# Patient Record
Sex: MALE | Race: WHITE | NOT HISPANIC OR LATINO
[De-identification: names, ages, dates, MRNs, and addresses within clinical notes are randomized per-mention and may not be internally consistent; named-entity substitution may affect disease eponyms.]

---

## 2021-08-25 PROBLEM — Z00.00 ENCOUNTER FOR PREVENTIVE HEALTH EXAMINATION: Status: ACTIVE | Noted: 2021-08-25

## 2021-08-26 ENCOUNTER — TRANSCRIPTION ENCOUNTER (OUTPATIENT)
Age: 36
End: 2021-08-26

## 2021-08-26 ENCOUNTER — NON-APPOINTMENT (OUTPATIENT)
Age: 36
End: 2021-08-26

## 2021-08-26 ENCOUNTER — APPOINTMENT (OUTPATIENT)
Dept: UROLOGY | Facility: CLINIC | Age: 36
End: 2021-08-26
Payer: COMMERCIAL

## 2021-08-26 VITALS
TEMPERATURE: 97.9 F | HEART RATE: 72 BPM | SYSTOLIC BLOOD PRESSURE: 120 MMHG | BODY MASS INDEX: 23.6 KG/M2 | OXYGEN SATURATION: 97 % | HEIGHT: 78 IN | DIASTOLIC BLOOD PRESSURE: 71 MMHG | WEIGHT: 204 LBS

## 2021-08-26 DIAGNOSIS — I86.1 SCROTAL VARICES: ICD-10-CM

## 2021-08-26 LAB
BILIRUB UR QL STRIP: NORMAL
CLARITY UR: CLEAR
COLLECTION METHOD: NORMAL
GLUCOSE UR-MCNC: NORMAL
HCG UR QL: 0.2 EU/DL
HGB UR QL STRIP.AUTO: NORMAL
KETONES UR-MCNC: NORMAL
LEUKOCYTE ESTERASE UR QL STRIP: NORMAL
NITRITE UR QL STRIP: NORMAL
PH UR STRIP: 5.5
PROT UR STRIP-MCNC: NORMAL
SP GR UR STRIP: 1.02

## 2021-08-26 PROCEDURE — 99204 OFFICE O/P NEW MOD 45 MIN: CPT

## 2021-08-26 NOTE — PHYSICAL EXAM
[General Appearance - Well Developed] : well developed [General Appearance - Well Nourished] : well nourished [Normal Appearance] : normal appearance [Well Groomed] : well groomed [General Appearance - In No Acute Distress] : no acute distress [Edema] : no peripheral edema [Abdomen Soft] : soft [] : no hepato-splenomegaly [Abdomen Tenderness] : non-tender [Abdomen Hernia] : no hernia was discovered [Costovertebral Angle Tenderness] : no ~M costovertebral angle tenderness [Urethral Meatus] : meatus normal [Penis Abnormality] : normal circumcised penis [Testes Tenderness] : no tenderness of the testes [Epididymis] : the epididymides were normal [Testes Mass (___cm)] : there were no testicular masses [FreeTextEntry1] : Left testis 2.5 x 5 cm moderate varicocele, empties supine. Right testis 3 x 5 cm, no varicocele noted. [Normal Station and Gait] : the gait and station were normal for the patient's age [Skin Turgor] : supple [No Focal Deficits] : no focal deficits [Oriented To Time, Place, And Person] : oriented to person, place, and time [Affect] : the affect was normal [Mood] : the mood was normal [Not Anxious] : not anxious

## 2021-08-26 NOTE — ASSESSMENT
[FreeTextEntry1] : We discussed findings on physical examination of the left varicocele and the possible correlation between the varicocele and male infertility and also possibly testicular pain.  I recommended evaluation including morning hormone panel and this was drawn today.  I also recommended a semen analysis and scrotal ultrasound these were ordered.  We will need to review these results after they are ready. Makenzie Rossi MD\par

## 2021-08-26 NOTE — HISTORY OF PRESENT ILLNESS
[FreeTextEntry1] : 35 YO M seen TODAY 8/26/2021 as NPT for fertility check. He and his wife have been trying to conceive for 6 months. No pregnancies, either together or with prior partners. INtercourse only. HIs wife is under the care of Dr. Harriet Thomas and has PCOD.  He reporets no prior surgery or treatment for infertility.  He also reports occasional pain in the left testicle after long walks.\par \par Patient is on no medication, no known medication allergies, non-smoker rare alcohol, less than 1 drink per week, no drugs.  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 \par All pertinent parts of the patient PFSH (past medical, family and social histories), laboratory, radiological studies and physician notes were reviewed prior to starting the face to face portion of the  visit. Questionnaire results were discussed with patient.\par

## 2021-08-27 LAB
ESTRADIOL SERPL-MCNC: 18 PG/ML
FSH SERPL-MCNC: 2.3 IU/L
LH SERPL-ACNC: 6.6 IU/L
PROLACTIN SERPL-MCNC: 9 NG/ML

## 2021-08-29 LAB
TESTOST BND SERPL-MCNC: 10.9 PG/ML
TESTOSTERONE TOTAL S: 442 NG/DL

## 2021-09-15 ENCOUNTER — APPOINTMENT (OUTPATIENT)
Dept: UROLOGY | Facility: CLINIC | Age: 36
End: 2021-09-15

## 2021-09-28 ENCOUNTER — NON-APPOINTMENT (OUTPATIENT)
Age: 36
End: 2021-09-28

## 2021-11-12 ENCOUNTER — APPOINTMENT (OUTPATIENT)
Dept: UROLOGY | Facility: CLINIC | Age: 36
End: 2021-11-12
Payer: COMMERCIAL

## 2021-11-12 PROCEDURE — 99215 OFFICE O/P EST HI 40 MIN: CPT

## 2021-11-12 NOTE — HISTORY OF PRESENT ILLNESS
[FreeTextEntry1] : 35 YO M seen TODAY 8/26/2021 as NPT for fertility check. He and his wife have been trying to conceive for 6 months. No pregnancies, either together or with prior partners. INtercourse only. HIs wife is under the care of Dr. Harriet Thomas and has PCOD.  He reports no prior surgery or treatment for infertility.  He also reports occasional pain in the left testicle after long walks.\par \par Patient is on no medication, no known medication allergies, non-smoker rare alcohol, less than 1 drink per week, no drugs.  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 AM hormone levels, SA and scrotal US ordered.\par \par Patient seen TODAY 11/12/2021 to review results and plan next steps.\par SA RMANY 8/31/2021\par Vol 3.7\par C 68 M\par Total 252 M/ml\par Motility 76%\par K Morphology 2% \par \par E 2 18\par LH\par 6.6\par PRO 9\par FSH 2.3\par \par FT 11\par \par Scrotal US 8/27/2021:\par No masses, bilateral small epididymal cysts, borderline left varicocele (3 mm) with Valsalva but in the supine position only.\par \par  The patient denies fevers, chills, nausea and or vomiting and no unexplained weight loss. \par All pertinent parts of the patient PFSH (past medical, family and social histories), laboratory, radiological studies and physician notes were reviewed prior to starting the face to face portion of the  visit. Questionnaire results were discussed with patient.\par

## 2021-11-12 NOTE — ASSESSMENT
[FreeTextEntry1] : We discussed the above findings in detail in the office today.  I reviewed with the patient the scrotal ultrasound the semen analysis and the hormone panel.  We discussed potential options moving forward.  He told me that his wife has an appointment for reproductive endocrinology evaluation next week he also asked for a referral and I supplied him with the name of doctors  and [facility].  We discussed repeating the semen analysis for confirmation for any surgical intervention is performed.\par \par Options at this point would be IVF/IC S RI using the sperm that the patient has versus treatment of the left varicocele to improve sperm morphology.  We discussed the indications for limitations of risks alternatives and chances for success of both of these techniques in detail.  Among the issues that were discussed include:\par Understanding a Varicocele \par \par You have just been told by Dr. Rossi that you have or may have a varicocele. You have been given this informational sheet to help you to understand what a varicocele is and how it can affect you . A varicocele is a collection of enlarged veins that drain either one or both testicles. It is located just above the affected testicle, in the upper scrotum. These veins are similar to varicose veins in the leg. As a result of being enlarged , these veins affect the blood circulation to the testicles. It is not necessary to have a varicocele on both sides to affect both testicles, since one varicocele can commonly affect both testicles.\par \par The effect of a varicocele is mostly on the production of sperm and, subsequently, on fertility. The varicocele commonly lowers the number of sperm produced and the movement, or motility, of the sperm. The varicocele can also affect the shape, or morphology, of the sperm. All these changes can lead to a decrease in the ability of a man to impregnate his wife when he has a varicocele. A varicocele can also lead to pain in one or both testes and it can sometimes be associated with atrophy, or shrinking of the testis. There is no evidence that a varicocele affects a man 's general health or shortens his life. It is not associated with the development of testicular cancer. The most common reason people seek treatment for a varicocele is for its affect on fertility.\par \par A varicocele is a common finding, being seen i n 15% of men. While most men who have a varicocele do not have infertility, in men who do have infertility we find the varicocele to be present in 40%. In men who have secondary infertility, which means that they have had at least one child and are having trouble conceiving the next child, we find the varicocele to be present in 80% of these men. This is apparently due to the varicocele having a progressive affect on the fertility potential in this population . While these men were younger, they may have been affected by the varicocele but were able to compensate for the effect due to the built in reserve in the system. As they get older, the effect of the varicocele is more pronounced  and they are no longer able to compensate adequately. Infertility results.\par \par The exact way in which the varicocele  affects fertility remains a mystery. Most investigators believe that the varicocele alters the normal blood flow past the testicles by allowing venous blood to pool around the testicles. This is believed to alter the temperature of the testicles and subsequently decrease fertility. Whether this is the only or the correct reason for the effect of the varicocele on fertility remains to be proven .\par \par \par Treatment of the varicocele can be performed one of several ways. The best way which is associated with the highest success rate and the lowest complication rate is the MICROSURGICAL INTERNAL SPERMATIC VEIN LIGATION WITH TESTICULAR EXPLORATION AND GUBERNACULAR VEIN LIGATION. If there is one varicocele, then this is performed on only that side. If there are two varicoceles present, then the procedure is performed on both sides at the same sitting. The procedure involves an incision on one side for each varicocele. It is made in the groin area and is similar to a hernia repair  incision. It extends for about  1 to  1 1/2 inches. The spermatic cord is explored using the operating microscope and instruments and the artery or arteries are each carefully  from the veins. The arteries are saved and the veins are tied or clipped and cut. The testicle is then brought through the incision. All veins on the surface of the testicle are then cut and tied . Everything is replaced and the wound is sewn closed. The skin is  closed with surgical staples. The procedure is  usually performed in an outpatient settling in the hospital. You come in, have the procedure done and go home the same day. Most men choose general anesthesia since it is easiest for you and you are not aware of any of the procedure. If you prefer, this can be done under spinal or epidural anesthesia as an alternate. At the end of the procedure you are given a long acting local anesthetic which keeps you pain free for the remainder of that day. Afterwards Extra Strength Tylenol is usually  adequate for any residual pain. You will be given a prescription for Tylenol and codeine just in case you need it. Usually patients are allowed to return to work after 5 days but you must limit your sexual activity and exercise for 4 weeks after surgery.\par \par Using this technique, there are very few complications. Dr. Rossi has performed approximately 2500 of these operations and has had no major complications. There can be some post operative swelling or bleeding but this is mild and self limited. There is always the possibility for an infection but this is also rare. Dr. Rossi does not routinely prescribe antibiotics . There is a chance that the varicocele can recur or can remain after surgery. This is due to the complex venous anatomy in this area and to the body's attempts to bypass the "blocked" vessels that have been tied. This occurs in about 5% of men when the MICROSURGICAL method is used as compared to about 15% when the older, non-microsurgical technique is employed. You may develop some fluid around the testicle after surgery. This is known as a hydrocele and usually has no effect on you or your sperm. It occurs less than 1% when the MICROSURGICAL method is used but is seen in about 5% of men who undergo the older method without the microscope . Any time we operate on the veins draining the testicle, there is a possibility that we may injure the arteries that feed the testicle. If this should happen , there is a remote chance that that testicle might stop functioning. If this were to happen to both you r testicles, you might become sterile and require some hormonal replacement therapy from that point on. However , this very rare complication has never happened to any of Dr. Rossi's patients in more than 35 years of practice. \par \par As  mentioned , there  are  some alternatives  to the  MICROSURGICAL  technique.  These  include  the  following: ( l )The older method with no magnification where as many veins as can be seen are tied. This method has a higher complication rate as described above and is less likely to succeed because it is difficult to tie all the veins while preserving the artery. (2) Percutaneous embolization of the veins. This technique uses x-ray guidance to place metal springs into the veins to clot them off. Because of the technical difficulty in this procedure, it is sometimes necessary to pass the catheter used to place the springs through the heart to gain access to some of these veins. (3) Laparoscopic vein ligation. This method relies on the placement of 4 to 5 telescopes into the abdominal cavity (4) Assisted Reproductive Technology. \par \par Patient will discuss these issues with his wife and notify me of his decision. Makenzie Rossi MD\par